# Patient Record
Sex: FEMALE | Race: WHITE | NOT HISPANIC OR LATINO | Employment: FULL TIME | URBAN - METROPOLITAN AREA
[De-identification: names, ages, dates, MRNs, and addresses within clinical notes are randomized per-mention and may not be internally consistent; named-entity substitution may affect disease eponyms.]

---

## 2023-10-24 ENCOUNTER — ANCILLARY PROCEDURE (OUTPATIENT)
Dept: RADIOLOGY | Facility: CLINIC | Age: 58
End: 2023-10-24
Payer: COMMERCIAL

## 2023-10-24 VITALS — HEIGHT: 64 IN | BODY MASS INDEX: 38.41 KG/M2 | WEIGHT: 225 LBS

## 2023-10-24 DIAGNOSIS — Z12.31 ENCOUNTER FOR SCREENING MAMMOGRAM FOR MALIGNANT NEOPLASM OF BREAST: ICD-10-CM

## 2023-10-24 PROCEDURE — 77063 BREAST TOMOSYNTHESIS BI: CPT

## 2023-10-24 PROCEDURE — 77067 SCR MAMMO BI INCL CAD: CPT | Performed by: RADIOLOGY

## 2023-10-24 PROCEDURE — 77063 BREAST TOMOSYNTHESIS BI: CPT | Performed by: RADIOLOGY

## 2025-03-12 ENCOUNTER — APPOINTMENT (OUTPATIENT)
Dept: PRIMARY CARE | Facility: CLINIC | Age: 60
End: 2025-03-12
Payer: COMMERCIAL

## 2025-03-12 VITALS
DIASTOLIC BLOOD PRESSURE: 77 MMHG | RESPIRATION RATE: 14 BRPM | HEART RATE: 78 BPM | TEMPERATURE: 96.9 F | HEIGHT: 64 IN | OXYGEN SATURATION: 98 % | WEIGHT: 251 LBS | BODY MASS INDEX: 42.85 KG/M2 | SYSTOLIC BLOOD PRESSURE: 112 MMHG

## 2025-03-12 DIAGNOSIS — Z78.0 POST-MENOPAUSAL: ICD-10-CM

## 2025-03-12 DIAGNOSIS — Z13.89 SCREENING FOR NEPHROPATHY: ICD-10-CM

## 2025-03-12 DIAGNOSIS — R29.818 SUSPECTED SLEEP APNEA: ICD-10-CM

## 2025-03-12 DIAGNOSIS — Z13.0 SCREENING FOR DEFICIENCY ANEMIA: ICD-10-CM

## 2025-03-12 DIAGNOSIS — Z00.00 HEALTHCARE MAINTENANCE: Primary | ICD-10-CM

## 2025-03-12 DIAGNOSIS — Z12.31 BREAST CANCER SCREENING BY MAMMOGRAM: ICD-10-CM

## 2025-03-12 DIAGNOSIS — Z13.220 SCREENING FOR HYPERLIPIDEMIA: ICD-10-CM

## 2025-03-12 DIAGNOSIS — F41.9 ANXIETY: ICD-10-CM

## 2025-03-12 DIAGNOSIS — I10 PRIMARY HYPERTENSION: ICD-10-CM

## 2025-03-12 DIAGNOSIS — Z13.21 ENCOUNTER FOR VITAMIN DEFICIENCY SCREENING: ICD-10-CM

## 2025-03-12 DIAGNOSIS — Z13.29 SCREENING FOR THYROID DISORDER: ICD-10-CM

## 2025-03-12 PROBLEM — B00.9 HERPES SIMPLEX TYPE 1 INFECTION: Status: ACTIVE | Noted: 2025-03-12

## 2025-03-12 PROBLEM — R73.03 PREDIABETES: Status: ACTIVE | Noted: 2018-02-26

## 2025-03-12 PROBLEM — M72.2 PLANTAR FASCIITIS, BILATERAL: Status: ACTIVE | Noted: 2025-03-12

## 2025-03-12 PROBLEM — K57.30 DIVERTICULOSIS OF COLON: Status: RESOLVED | Noted: 2018-02-22 | Resolved: 2025-03-12

## 2025-03-12 PROCEDURE — 3078F DIAST BP <80 MM HG: CPT

## 2025-03-12 PROCEDURE — 1036F TOBACCO NON-USER: CPT

## 2025-03-12 PROCEDURE — 3074F SYST BP LT 130 MM HG: CPT

## 2025-03-12 PROCEDURE — 99213 OFFICE O/P EST LOW 20 MIN: CPT

## 2025-03-12 PROCEDURE — 3008F BODY MASS INDEX DOCD: CPT

## 2025-03-12 PROCEDURE — 99386 PREV VISIT NEW AGE 40-64: CPT

## 2025-03-12 RX ORDER — VALACYCLOVIR HYDROCHLORIDE 1 G/1
500 TABLET, FILM COATED ORAL AS NEEDED
COMMUNITY
Start: 2024-11-26

## 2025-03-12 RX ORDER — ASPIRIN 81 MG/1
81 TABLET ORAL DAILY
COMMUNITY
End: 2025-03-12 | Stop reason: ALTCHOICE

## 2025-03-12 RX ORDER — VALSARTAN 40 MG/1
40 TABLET ORAL
COMMUNITY

## 2025-03-12 RX ORDER — CHOLECALCIFEROL (VITAMIN D3) 50 MCG
50 TABLET ORAL DAILY
COMMUNITY

## 2025-03-12 RX ORDER — PAROXETINE 10 MG/1
10 TABLET, FILM COATED ORAL DAILY
COMMUNITY
Start: 2024-11-02

## 2025-03-12 SDOH — ECONOMIC STABILITY: FOOD INSECURITY: WITHIN THE PAST 12 MONTHS, THE FOOD YOU BOUGHT JUST DIDN'T LAST AND YOU DIDN'T HAVE MONEY TO GET MORE.: NEVER TRUE

## 2025-03-12 SDOH — ECONOMIC STABILITY: FOOD INSECURITY: WITHIN THE PAST 12 MONTHS, YOU WORRIED THAT YOUR FOOD WOULD RUN OUT BEFORE YOU GOT MONEY TO BUY MORE.: NEVER TRUE

## 2025-03-12 ASSESSMENT — ANXIETY QUESTIONNAIRES
5. BEING SO RESTLESS THAT IT IS HARD TO SIT STILL: NOT AT ALL
7. FEELING AFRAID AS IF SOMETHING AWFUL MIGHT HAPPEN: NOT AT ALL
1. FEELING NERVOUS, ANXIOUS, OR ON EDGE: MORE THAN HALF THE DAYS
3. WORRYING TOO MUCH ABOUT DIFFERENT THINGS: NEARLY EVERY DAY
4. TROUBLE RELAXING: NOT AT ALL
6. BECOMING EASILY ANNOYED OR IRRITABLE: MORE THAN HALF THE DAYS
GAD7 TOTAL SCORE: 10
IF YOU CHECKED OFF ANY PROBLEMS ON THIS QUESTIONNAIRE, HOW DIFFICULT HAVE THESE PROBLEMS MADE IT FOR YOU TO DO YOUR WORK, TAKE CARE OF THINGS AT HOME, OR GET ALONG WITH OTHER PEOPLE: SOMEWHAT DIFFICULT
2. NOT BEING ABLE TO STOP OR CONTROL WORRYING: NEARLY EVERY DAY

## 2025-03-12 ASSESSMENT — ENCOUNTER SYMPTOMS
CONSTITUTIONAL NEGATIVE: 1
NEUROLOGICAL NEGATIVE: 1
PSYCHIATRIC NEGATIVE: 1
ENDOCRINE NEGATIVE: 1
MUSCULOSKELETAL NEGATIVE: 1
GASTROINTESTINAL NEGATIVE: 1
CARDIOVASCULAR NEGATIVE: 1
HEMATOLOGIC/LYMPHATIC NEGATIVE: 1
RESPIRATORY NEGATIVE: 1
EYES NEGATIVE: 1

## 2025-03-12 ASSESSMENT — PAIN SCALES - GENERAL: PAINLEVEL_OUTOF10: 0-NO PAIN

## 2025-03-12 ASSESSMENT — PATIENT HEALTH QUESTIONNAIRE - PHQ9
2. FEELING DOWN, DEPRESSED OR HOPELESS: SEVERAL DAYS
SUM OF ALL RESPONSES TO PHQ9 QUESTIONS 1 & 2: 2
1. LITTLE INTEREST OR PLEASURE IN DOING THINGS: SEVERAL DAYS
10. IF YOU CHECKED OFF ANY PROBLEMS, HOW DIFFICULT HAVE THESE PROBLEMS MADE IT FOR YOU TO DO YOUR WORK, TAKE CARE OF THINGS AT HOME, OR GET ALONG WITH OTHER PEOPLE: NOT DIFFICULT AT ALL

## 2025-03-12 ASSESSMENT — LIFESTYLE VARIABLES
HOW MANY STANDARD DRINKS CONTAINING ALCOHOL DO YOU HAVE ON A TYPICAL DAY: 1 OR 2
HOW OFTEN DO YOU HAVE A DRINK CONTAINING ALCOHOL: MONTHLY OR LESS
AUDIT-C TOTAL SCORE: 1
HOW OFTEN DO YOU HAVE SIX OR MORE DRINKS ON ONE OCCASION: NEVER
SKIP TO QUESTIONS 9-10: 1

## 2025-03-12 NOTE — PATIENT INSTRUCTIONS
Thank you for coming to see me today.  If you have any questions or concerns following our visit, please contact the office.  Phone: [phone]    Follow up with me in 4 months or sooner as needed    1)  Get fasting labwork a week prior to next visit.  The lab is down the [place]omas from our office.     2) Get copy of sleep study from last provider so we can have it looked over by sleep medicine specialist    3) Please schedule a mammogram - please call [phone] or schedule at  on your way out today     4) Work on cutting out processed foods/artificial sugars from diet. Work on cutting back carbs in diet, focus on protein and veggies    5) Increase water intake to 64 oz per day

## 2025-03-12 NOTE — ASSESSMENT & PLAN NOTE
"Maintained on paroxetine 10mg daily; can't take 20mg daily due to it causing her to \"go off the rail\". States it made her more anxious  Has been on paroxetine since 2010, at this time her  sustained major ortho injury. Feels like symptoms could be better controlled.  Concern this could be contributing to weight gain  Experiences a lot of situational anxiety, \"stares down the rabbit hole\". Does a lot of \"what if\" thinking. Unsure if this controls the \"what if's\", will distract herself    Continue paroxetine 10mg daily for now  Discuss medication change at next visit  "

## 2025-03-12 NOTE — ASSESSMENT & PLAN NOTE
Advised to obtain sleep study from former provider  Consider referral to sleep medicine pending results

## 2025-03-12 NOTE — ASSESSMENT & PLAN NOTE
-Healthy diet, good quality and duration of sleep, healthy water intake, regular exercise and healthy weight discussed  -Immunizations:   Flu: Recommended annually  Shingrix: Recommended   Tdap: Last in 7/2017  -Following with dentistry and optometry regularly  -Colon cancer screening: Colonoscopy in 2024  -Mammogram:: Recommended and ordered  -DEXA: Post menopausal x 3 years, DEXA recommended and ordered  -GYN: Last PAP in 2022, adv to return in 2027  -No concerns for anxiety/depression  -Tobacco never, EtOH rarely, No illicit/recreational drugs  -Feeling safe at home

## 2025-04-04 ENCOUNTER — HOSPITAL ENCOUNTER (OUTPATIENT)
Dept: RADIOLOGY | Facility: CLINIC | Age: 60
Discharge: HOME | End: 2025-04-04
Payer: COMMERCIAL

## 2025-04-04 VITALS — HEIGHT: 64 IN | BODY MASS INDEX: 42.51 KG/M2 | WEIGHT: 249 LBS

## 2025-04-04 DIAGNOSIS — Z78.0 POST-MENOPAUSAL: ICD-10-CM

## 2025-04-04 DIAGNOSIS — Z12.31 BREAST CANCER SCREENING BY MAMMOGRAM: ICD-10-CM

## 2025-04-04 PROCEDURE — 77067 SCR MAMMO BI INCL CAD: CPT

## 2025-04-04 PROCEDURE — 77063 BREAST TOMOSYNTHESIS BI: CPT

## 2025-04-04 PROCEDURE — 77080 DXA BONE DENSITY AXIAL: CPT

## 2025-06-10 DIAGNOSIS — F41.9 ANXIETY: Primary | ICD-10-CM

## 2025-06-10 DIAGNOSIS — I10 PRIMARY HYPERTENSION: ICD-10-CM

## 2025-06-11 RX ORDER — VALSARTAN 40 MG/1
40 TABLET ORAL
Qty: 90 TABLET | Refills: 3 | Status: SHIPPED | OUTPATIENT
Start: 2025-06-11

## 2025-06-11 RX ORDER — PAROXETINE 10 MG/1
10 TABLET, FILM COATED ORAL EVERY MORNING
Qty: 90 TABLET | Refills: 3 | Status: SHIPPED | OUTPATIENT
Start: 2025-06-11

## 2025-07-21 ENCOUNTER — APPOINTMENT (OUTPATIENT)
Dept: PRIMARY CARE | Facility: CLINIC | Age: 60
End: 2025-07-21
Payer: COMMERCIAL

## 2026-01-16 ENCOUNTER — APPOINTMENT (OUTPATIENT)
Dept: PRIMARY CARE | Facility: CLINIC | Age: 61
End: 2026-01-16
Payer: COMMERCIAL